# Patient Record
(demographics unavailable — no encounter records)

---

## 2025-03-03 NOTE — HISTORY OF PRESENT ILLNESS
[FreeTextEntry1] : The patient is a 19-year-old male who presents with complaints related to the foreskin. He reports that these issues began at birth and have remained stable over time. The patient describes the condition as inconvenient, rating the severity as a 7 out of 10. He believes the cause of his foreskin issues is unknown. He reports that sexual intercourse exacerbates the discomfort associated with his foreskin condition, which includes pain during intercourse. Previously, he attempted to alleviate his symptoms by performing regular retraction of the foreskin, but he now states he is unable to retract it at all, which has led to increased difficulty cleaning the penis. When asked for any additional details, the patient confirmed he has further information to share regarding his symptoms.

## 2025-03-03 NOTE — ASSESSMENT
[FreeTextEntry1] : 19-year-old uncircumcised male with no significant past medical history he reports phimosis.  Patient is uninsured with no acute urgency for immediate circumcision although there are appropriate indications for the procedure.  He will consider circumcision after attempt to obtain insurance coverage.  Plan:  office follow-up as needed

## 2025-03-03 NOTE — PHYSICAL EXAM
[General Appearance - Well Developed] : well developed [Normal Appearance] : normal appearance [General Appearance - In No Acute Distress] : no acute distress [] : no respiratory distress [Urethral Meatus] : meatus normal [Scrotum] : the scrotum was normal [Testes Tenderness] : no tenderness of the testes [Oriented To Time, Place, And Person] : oriented to person, place, and time [Affect] : the affect was normal [Mood] : the mood was normal [de-identified] : no inguinal hernias [de-identified] : SLight tightness of foreskin - fairly easily retractable but caused apparent discomfort